# Patient Record
(demographics unavailable — no encounter records)

---

## 2020-12-06 NOTE — EMERGENCY DEPARTMENT REPORT
ED General Adult HPI





- General


Chief complaint: Abdominal Pain


Stated complaint: STOMACH PAIN/VOMIT/CYST


Time Seen by Provider: 20 10:29


Source: patient


Mode of arrival: Ambulatory


Limitations: No Limitations





- History of Present Illness


Initial comments: 





Patient is a 30-year-old female presents emergency room with complaints of 

abdominal cramping that began 5 days ago.  She states her menstrual cycle began 

5 days ago.  She states that she has been having bleeding for 5 days.  She 

states that she also has associated nausea and a few episodes of vomiting.  She 

denies any fever, diarrhea, dysuria, abnormal vaginal discharge, dizziness, 

palpitations, shortness of breath.  She states that she has an appointment with 

an OB/GYN coming up.  She was evaluated in the emergency department for the same

symptoms on 2020 and was diagnosed with ovarian cyst but states that she 

has not yet been able to follow-up.  She denies any other past medical history. 

She has an allergy to Zofran and Toradol.





- Related Data


                                  Previous Rx's











 Medication  Instructions  Recorded  Last Taken  Type


 


Docusate Sodium [Colace CAP] 100 mg PO BID PRN #30 capsule 18 Unknown Rx


 


Ibuprofen [Motrin] 800 mg PO Q8HR PRN #20 tablet 18 Unknown Rx


 


polyethylene glycoL 3350 [Miralax 17 gm PO QDAY PRN #1 box 18 Unknown Rx





3350]    


 


HYDROcodone/APAP 5-325 [[place] 1 each PO Q6HR PRN #10 tablet 19 Unknown Rx





5/325]    


 


HYDROcodone/APAP 5-325 [[place] 1 each PO Q6HR PRN #8 tablet 20 Unknown Rx





5/325]    


 


Metoclopramide [Reglan] 10 mg PO TID PRN #15 tab 20 Unknown Rx


 


Nitrofurantoin Mono/M-Cryst 100 mg PO Q12HR #14 capsule 20 Unknown Rx





[Macrobid CAP]    


 


medroxyPROGESTERone ACETATE 10 mg PO QDAY #7 tablet 20 Unknown Rx





[Provera]    


 


Metoclopramide [Reglan] 10 mg PO Q8HR PRN #12 tab 20 Unknown Rx


 


Promethazine [Phenergan] 25 mg VA Q6HR PRN #7 supp.rect 20 Unknown Rx











                                    Allergies











Allergy/AdvReac Type Severity Reaction Status Date / Time


 


ketorolac [From Toradol] Allergy  Unknown Verified 19 09:44


 


ondansetron Allergy  Hives Verified 18 13:37





[From Zofran (as     





hydrochloride)]     














ED Review of Systems


ROS: 


Stated complaint: STOMACH PAIN/VOMIT/CYST


Other details as noted in HPI





Comment: All other systems reviewed and negative





ED Past Medical Hx





- Past Medical History


Previous Medical History?: Yes


Additional medical history: Abd pain, Vaginal delivery x 1, Abnormal uterine 

bleeding and heavy menses





- Surgical History


Past Surgical History?: Yes


Additional Surgical History:  x 1





- Social History


Smoking Status: Never Smoker


Substance Use Type: None





- Medications


Home Medications: 


                                Home Medications











 Medication  Instructions  Recorded  Confirmed  Last Taken  Type


 


Docusate Sodium [Colace CAP] 100 mg PO BID PRN #30 capsule 18  Unknown Rx


 


Ibuprofen [Motrin] 800 mg PO Q8HR PRN #20 tablet 18  Unknown Rx


 


polyethylene glycoL 3350 [Miralax 17 gm PO QDAY PRN #1 box 18  Unknown Rx





3350]     


 


HYDROcodone/APAP 5-325 [[place] 1 each PO Q6HR PRN #10 tablet 19  Unknown Rx





5/325]     


 


HYDROcodone/APAP 5-325 [[place] 1 each PO Q6HR PRN #8 tablet 20  Unknown Rx





5/325]     


 


Metoclopramide [Reglan] 10 mg PO TID PRN #15 tab 20  Unknown Rx


 


Nitrofurantoin Mono/M-Cryst 100 mg PO Q12HR #14 capsule 20  Unknown Rx





[Macrobid CAP]     


 


medroxyPROGESTERone ACETATE 10 mg PO QDAY #7 tablet 20  Unknown Rx





[Provera]     


 


Metoclopramide [Reglan] 10 mg PO Q8HR PRN #12 tab 20  Unknown Rx


 


Promethazine [Phenergan] 25 mg VA Q6HR PRN #7 supp.rect 20  Unknown Rx














ED Physical Exam





- General


Limitations: No Limitations


General appearance: alert, in no apparent distress





- Head


Head exam: Present: atraumatic, normocephalic





- Eye


Eye exam: Present: normal appearance





- ENT


ENT exam: Present: mucous membranes moist





- Respiratory


Respiratory exam: Present: normal lung sounds bilaterally.  Absent: respiratory 

distress, wheezes, rales, rhonchi, stridor, chest wall tenderness, accessory 

muscle use, decreased breath sounds, prolonged expiratory





- Cardiovascular


Cardiovascular Exam: Present: regular rate, normal rhythm, normal heart sounds. 

 Absent: systolic murmur, diastolic murmur, rubs, gallop





- GI/Abdominal


GI/Abdominal exam: Present: soft, normal bowel sounds.  Absent: distended, 

tenderness, guarding, rebound, rigid





- Neurological Exam


Neurological exam: Present: alert, oriented X3





- Psychiatric


Psychiatric exam: Present: normal affect, normal mood





- Skin


Skin exam: Present: warm, dry, intact





ED Course


                                   Vital Signs











  20





  09:18 13:44 14:11


 


Temperature 99 F  


 


Pulse Rate 94 H  89


 


Respiratory 20 18 18





Rate   


 


Blood Pressure 120/74  


 


O2 Sat by Pulse 100  100





Oximetry   














ED Medical Decision Making





- Lab Data








                                   Vital Signs











  20





  09:18 13:44 14:11


 


Temperature 99 F  


 


Pulse Rate 94 H  89


 


Respiratory 20 18 18





Rate   


 


Blood Pressure 120/74  


 


O2 Sat by Pulse 100  100





Oximetry   














- Medical Decision Making





Patient is a 30-year-old female presents emergency room with complaints of 

abdominal cramping that began 5 days ago.  She states her menstrual cycle began 

5 days ago.  She states that she has been having bleeding for 5 days.  She 

states that she also has associated nausea and a few episodes of vomiting.  She 

denies any fever, diarrhea, dysuria, abnormal vaginal discharge, dizziness, 

palpitations, shortness of breath.  She states that she has an appointment with 

an OB/GYN coming up.  She was evaluated in the emergency department for the same

 symptoms on 2020 and was diagnosed with ovarian cyst but states that she 

has not yet been able to follow-up.  She denies any other past medical history. 

 She has an allergy to Zofran and Toradol.  Vitals are normal.  No abdominal 

tenderness on exam, abdomen is soft, nondistended, no guarding, no rebound, no 

rigidity, no peritoneal signs, normal bowel sounds.  Patient given Reglan and 

Benadryl while in the emergency department and had no further episodes of 

vomiting.  She was able to tolerate p.o. intake without difficulty.  She was 

given naproxen for pain relief and was able to tolerate that without difficulty.

  Patient does not have any clinical signs or symptoms of ovarian torsion or 

acute emergent intra-abdominal pathology.  Symptoms likely related to her 

menstrual cycle/dysmenorrhea and her ovarian cyst.  Discussed the importance of 

OB/GYN follow-up with patient.  Patient is not currently on birth control, 

advised patient to discuss birth control with her OB/GYN given ovarian cyst.  

Discussed very strict return precautions with patient.  Advised patient to be 

reexamined within the next 3 days.  Patient given prescription for Reglan and 

also given a prescription for Phenergan suppositories if Reglan is not helping. 

 Advised patient Please use medication as prescribed.  Increase your water 

intake.  May take Tylenol or ibuprofen as needed for discomfort.  Follow-up with

 OB/GYN.  It is very important that you follow-up.  Return to emergency room any

 new or worsening symptoms.


Critical care attestation.: 


If time is entered above; I have spent that time in minutes in the direct care 

of this critically ill patient, excluding procedure time.








ED Disposition


Clinical Impression: 


 Abnormal uterine bleeding (AUB), Dysmenorrhea





Disposition:  TO HOME OR SELFCARE


Is pt being admited?: No


Does the pt Need Aspirin: No


Condition: Stable


Instructions:  Abnormal Uterine Bleeding, Dysmenorrhea, Easy-to-Read, Abdominal 

Pain (ED)


Additional Instructions: 


Please use medication as prescribed.  Increase your water intake.  May take 

Tylenol or ibuprofen as needed for discomfort.  Follow-up with OB/GYN.  It is 

very important that you follow-up.  Return to emergency room any new or 

worsening symptoms.


Prescriptions: 


Promethazine [Phenergan] 25 mg VA Q6HR PRN #7 supp.rect


 PRN Reason: Nausea And Vomiting


Metoclopramide [Reglan] 10 mg PO Q8HR PRN #12 tab


 PRN Reason: Nausea And Vomiting


Referrals: 


PRIMARY CARE,MD [Primary Care Provider] - 2-3 Days


your, ob/gyn [Other] - 2-3 Days


Time of Disposition: 13:56


Print Language: ENGLISH